# Patient Record
(demographics unavailable — no encounter records)

---

## 2024-12-04 NOTE — ASSESSMENT
[FreeTextEntry1] : Patient presents for new patient Gastroenterology evaluation because of multiple complex Gastrointestinal signs and symptoms. Has a history of esophagitis, and also is due for colon cancer screening with screening colonoscopy.   Will schedule patient for combined screening colonoscopy and upper gastrointestinal endoscopy at a [facility].   Moderate degree of complexity of medical decision making involved in this patient encounter

## 2024-12-04 NOTE — HISTORY OF PRESENT ILLNESS
[FreeTextEntry1] : Chief complaint: encounter for colon cancer screening, Xiong's esophagus   HPI: Patient presents for new patient Gastroenterology evaluation because of multiple Gastrointestinal signs and symptoms. Intermittent crampy periumbilical discomfort which is non radiating and has no clear exacerbating or ameliorating factors. No fevers sweats or chills. Has a personal history of Xiong's metaplasia of the esophagus. No sign of acute gastrointestinal bleeding such as hematemesis, melena or hematochezia.   Will schedule patient at [facility] for combined screening colonoscopy and upper gastrointestinal endoscopy. The nature, purpose and methods of the procedures were reviewed with patient who gives informed consent.   Because of the  possibility of an acute colonic infection such as diverticulitis, will empirically send a ten day course of antibiotics.   Moderate degree of complexity of medical decision making involved in this patient encounter

## 2024-12-04 NOTE — PHYSICAL EXAM
